# Patient Record
Sex: FEMALE | Race: WHITE | NOT HISPANIC OR LATINO | Employment: FULL TIME | URBAN - METROPOLITAN AREA
[De-identification: names, ages, dates, MRNs, and addresses within clinical notes are randomized per-mention and may not be internally consistent; named-entity substitution may affect disease eponyms.]

---

## 2017-02-28 ENCOUNTER — CONSULT (OUTPATIENT)
Dept: OBSTETRICS AND GYNECOLOGY | Age: 37
End: 2017-02-28

## 2017-02-28 VITALS — SYSTOLIC BLOOD PRESSURE: 102 MMHG | HEIGHT: 64 IN | DIASTOLIC BLOOD PRESSURE: 58 MMHG

## 2017-02-28 DIAGNOSIS — Z30.09 EVALUATION REGARDING CONTRACEPTION OPTIONS: Primary | ICD-10-CM

## 2017-02-28 PROCEDURE — 99213 OFFICE O/P EST LOW 20 MIN: CPT | Performed by: OBSTETRICS & GYNECOLOGY

## 2017-02-28 NOTE — PROGRESS NOTES
Subjective   Hailey Mason is a 36 y.o. female is being seen today for Consult about paragard  Chief Complaint   Patient presents with   • Consult     contraception   .    History of Present Illness  Patient is here to discuss contraceptive options.  She has been on condoms for quite a while and that is okay and she is using pill in the distant past.  She grabbed not use any hormonal method so we talked about all the different types of birth control today and specifically, straight on the ParaGard IUD.  I told her about how it has been approved and half hours placed I told her the risks including perforation abnormal placement infection and body rejection.  I gave her the brochure and she will want us to check insurance for this.  The following portions of the patient's history were reviewed and updated as appropriate: allergies, current medications, past family history, past medical history, past social history, past surgical history and problem list.    PAST MEDICAL HISTORY  History reviewed. No pertinent past medical history.  OB History     No data available        History reviewed. No pertinent past surgical history.  History reviewed. No pertinent family history.  History   Smoking Status   • Never Smoker   Smokeless Tobacco   • Not on file     No current outpatient prescriptions on file.    There is no immunization history on file for this patient.    Review of Systems  Discussed  Objective   Physical Exam    Well-developed well-nourished white female no acute distress  Assessment/Plan   Hailey was seen today for consult.    Diagnoses and all orders for this visit:    Evaluation regarding contraception options      We'll check insurance for IUD she will call back on it.  If she indeed wants to have it placed

## 2017-06-07 ENCOUNTER — OFFICE VISIT (OUTPATIENT)
Dept: OBSTETRICS AND GYNECOLOGY | Age: 37
End: 2017-06-07

## 2017-06-07 VITALS
DIASTOLIC BLOOD PRESSURE: 64 MMHG | HEIGHT: 64 IN | SYSTOLIC BLOOD PRESSURE: 122 MMHG | WEIGHT: 130 LBS | BODY MASS INDEX: 22.2 KG/M2

## 2017-06-07 DIAGNOSIS — Z30.09 CONTRACEPTIVE EDUCATION: Primary | ICD-10-CM

## 2017-06-07 LAB
B-HCG UR QL: NEGATIVE
INTERNAL NEGATIVE CONTROL: NEGATIVE
INTERNAL POSITIVE CONTROL: POSITIVE
Lab: NORMAL

## 2017-06-07 PROCEDURE — 81025 URINE PREGNANCY TEST: CPT | Performed by: PHYSICIAN ASSISTANT

## 2017-06-07 PROCEDURE — 99212 OFFICE O/P EST SF 10 MIN: CPT | Performed by: PHYSICIAN ASSISTANT

## 2017-06-07 NOTE — PROGRESS NOTES
"Subjective     Chief Complaint   Patient presents with   • Procedure     paragard insert       Hailey Mason is a 36 y.o. No obstetric history on file. whose LMP is Patient's last menstrual period was 06/02/2017 (exact date). presents for her IUD insertion.  However she is getting ready to go on a trip to [facility] and leaves almost exactly at the same time as her period start at the same time.  We discussed r/b/a, but after much discussion we decided that since she knows how her period is without the IUD, that it would be best to wait until she was in country after the IUD placement just in case of adverse SE.  She was on the fence anyway and I think feels more comfortable waiting to insert so as not to ruin her vacation      No Additional Complaints Reported    The following portions of the patient's history were reviewed and updated as appropriate:vital signs, allergies, current medications, past family history, past medical history, past social history, past surgical history and problem list      Review of Systems   Pertinent items are noted in HPI.     Objective      /64  Ht 64\" (162.6 cm)  Wt 130 lb (59 kg)  LMP 06/02/2017 (Exact Date)  Breastfeeding? No  BMI 22.31 kg/m2    Physical Exam    General:   alert, comfortable and no distress   Heart: Not performed today   Lungs: Not performed today.   Breast: Not performed today   Neck: na   Abdomen: {Not performed today   CVA: Not performed today   Pelvis: Not performed today   Extremities: Not performed today   Neurologic: Not performed today   Psychiatric: Normal affect, judgement, and mood       Lab Review   Labs: Urine pregnancy test     Imaging   No data reviewed    Assessment/Plan     ASSESSMENT  1. Contraceptive education        PLAN  1.   Orders Placed This Encounter   Procedures   • POC Pregnancy, Urine       2. After discussion with pt, we opted to hold off on the insertion since we were unsure how her period would be.  She will c/w " condoms until she has her cycle and call for placement          Follow up: 8 week(s)approx for IUD insert    ALIVIA Campbell  6/7/2017

## 2017-10-03 ENCOUNTER — TELEPHONE (OUTPATIENT)
Dept: OBSTETRICS AND GYNECOLOGY | Age: 37
End: 2017-10-03

## 2018-12-24 ENCOUNTER — OFFICE VISIT (OUTPATIENT)
Dept: OBSTETRICS AND GYNECOLOGY | Age: 38
End: 2018-12-24

## 2018-12-24 VITALS
SYSTOLIC BLOOD PRESSURE: 102 MMHG | HEIGHT: 62 IN | BODY MASS INDEX: 24.84 KG/M2 | WEIGHT: 135 LBS | DIASTOLIC BLOOD PRESSURE: 62 MMHG

## 2018-12-24 DIAGNOSIS — Z01.419 ENCOUNTER FOR WELL WOMAN EXAM: Primary | ICD-10-CM

## 2018-12-24 DIAGNOSIS — Z00.00 ANNUAL PHYSICAL EXAM: ICD-10-CM

## 2018-12-24 DIAGNOSIS — K59.01 SLOW TRANSIT CONSTIPATION: ICD-10-CM

## 2018-12-24 PROCEDURE — 99395 PREV VISIT EST AGE 18-39: CPT | Performed by: OBSTETRICS & GYNECOLOGY

## 2018-12-24 NOTE — PROGRESS NOTES
Subjective   Hailey Mason is a 38 y.o. female is being seen today for   Chief Complaint   Patient presents with   • Gynecologic Exam     annual exam last pap 06/26/2015 neg   .    History of Present Illness  Patient is here for an annual check.  Couple years since she is doing well with no particular changes or problems last couple of years.  Still has little bit of constipation takes a couple prune seems to work well.  Her mother came down with ulcerative colitis this year but she is now under control.  Her father has been disabled for many many many years but he is still doing okay.  He is 70 she is 73.  Otherwise her bowels bladder work well nothing else in the family.  Brothers 37 lives in [place] has a long-term relationship in [place].  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  There is no interest in pregnancy at this time but did not 1 anything permanently done.  The following portions of the patient's history were reviewed and updated as appropriate: allergies, current medications, past family history, past medical history, past social history, past surgical history and problem list.    Vitals:    12/24/18 1048   BP: 102/62       PAST MEDICAL HISTORY  History reviewed. No pertinent past medical history.  OB History     No data available        History reviewed. No pertinent surgical history.  History reviewed. No pertinent family history.  Social History     Tobacco Use   Smoking Status Never Smoker     No current outpatient medications on file.    There is no immunization history on file for this patient.    Review of Systems   Constitutional: Negative for chills, fatigue, fever and unexpected weight change.   Respiratory: Negative for shortness of breath and wheezing.    Cardiovascular: Negative for chest pain.   Gastrointestinal: Negative for abdominal distention, abdominal pain, blood in stool, constipation, diarrhea and nausea.   Genitourinary: Negative  for difficulty urinating, dyspareunia, dysuria, frequency, hematuria, menstrual problem, pelvic pain, urgency and vaginal discharge.   Skin: Negative for rash.       Objective   Physical Exam   Constitutional: She is oriented to person, place, and time. Vital signs are normal. She appears well-developed and well-nourished.   Neck: No thyromegaly present.   Cardiovascular: Normal rate, regular rhythm and normal heart sounds.   Pulmonary/Chest: Effort normal. Right breast exhibits no inverted nipple, no mass, no nipple discharge, no skin change and no tenderness. Left breast exhibits no inverted nipple, no mass, no nipple discharge, no skin change and no tenderness. Breasts are symmetrical. There is no breast swelling.   Abdominal: Soft.   Genitourinary: Vagina normal and uterus normal. No breast tenderness, discharge or bleeding. Pelvic exam was performed with patient supine. No labial fusion. There is no rash, tenderness, lesion or injury on the right labia. There is no rash, tenderness, lesion or injury on the left labia. Cervix exhibits no motion tenderness, no discharge and no friability. Right adnexum displays no mass, no tenderness and no fullness. Left adnexum displays no mass, no tenderness and no fullness.   Neurological: She is alert and oriented to person, place, and time.   Psychiatric: She has a normal mood and affect.   Vitals reviewed.        Assessment/Plan   Hailey was seen today for gynecologic exam.    Diagnoses and all orders for this visit:    Encounter for well woman exam  -     IGP, Apt HPV,rfx 16 / 18,45; Future  -     IGP, Apt HPV,rfx 16 / 18,45    Annual physical exam    Slow transit constipation      Overall normal exam today.  Pap smear done today.  Mammogram at 39.  Talked about exercise she is good with that back in year.  Her cycles are regular no problems with that and she is using condoms and again no problems with that

## 2018-12-27 LAB
CYTOLOGIST CVX/VAG CYTO: NORMAL
CYTOLOGY CVX/VAG DOC THIN PREP: NORMAL
DX ICD CODE: NORMAL
HIV 1 & 2 AB SER-IMP: NORMAL
HPV I/H RISK 4 DNA CVX QL PROBE+SIG AMP: NEGATIVE
OTHER STN SPEC: NORMAL
PATH REPORT.FINAL DX SPEC: NORMAL
STAT OF ADQ CVX/VAG CYTO-IMP: NORMAL

## 2020-01-02 ENCOUNTER — OFFICE VISIT (OUTPATIENT)
Dept: OBSTETRICS AND GYNECOLOGY | Age: 40
End: 2020-01-02

## 2020-01-02 VITALS
HEIGHT: 62 IN | SYSTOLIC BLOOD PRESSURE: 102 MMHG | BODY MASS INDEX: 25.95 KG/M2 | WEIGHT: 141 LBS | DIASTOLIC BLOOD PRESSURE: 60 MMHG

## 2020-01-02 DIAGNOSIS — Z00.00 ANNUAL PHYSICAL EXAM: Primary | ICD-10-CM

## 2020-01-02 PROCEDURE — 99396 PREV VISIT EST AGE 40-64: CPT | Performed by: OBSTETRICS & GYNECOLOGY

## 2020-01-02 RX ORDER — FLUCONAZOLE 150 MG/1
150 TABLET ORAL ONCE
Qty: 2 TABLET | Refills: 2 | Status: SHIPPED | OUTPATIENT
Start: 2020-01-02 | End: 2020-01-02

## 2020-01-02 NOTE — PROGRESS NOTES
Subjective   Hailey Mason is a 39 y.o. female is being seen today for   Chief Complaint   Patient presents with   • Gynecologic Exam     Pap 2018   .    History of Present Illness  Patient is here for an annual exam.  She is doing very well overall medically she is concerned about by gaining a few pounds.  She exercises well and eats pretty well so she is just try to stay on top of that.  She is changed jobs she is little more sedentary than she was before.  Still using condoms with her  doing well without no interest in pregnancy.  Nothing new in the family father had his knee replaced but that seems okay.  Bowels are the same bladder is fine.  She is also done for minor traveling this past year  The following portions of the patient's history were reviewed and updated as appropriate: allergies, current medications, past family history, past medical history, past social history, past surgical history and problem list.    Vitals:    01/02/20 1535   BP: 102/60       PAST MEDICAL HISTORY  History reviewed. No pertinent past medical history.  OB History    None       History reviewed. No pertinent surgical history.  History reviewed. No pertinent family history.  Social History     Tobacco Use   Smoking Status Never Smoker     No current outpatient medications on file.    There is no immunization history on file for this patient.    Review of Systems   Constitutional: Negative for chills, fatigue, fever and unexpected weight change.   Respiratory: Negative for shortness of breath and wheezing.    Cardiovascular: Negative for chest pain.   Gastrointestinal: Negative for abdominal distention, abdominal pain, blood in stool, constipation, diarrhea and nausea.   Genitourinary: Negative for difficulty urinating, dyspareunia, dysuria, frequency, hematuria, menstrual problem, pelvic pain, urgency and vaginal discharge.   Skin: Negative for rash.       Objective   Physical Exam   Constitutional: She is oriented  to person, place, and time. Vital signs are normal. She appears well-developed and well-nourished.   Neck: No thyromegaly present.   Cardiovascular: Normal rate, regular rhythm and normal heart sounds.   Pulmonary/Chest: Effort normal. Right breast exhibits no inverted nipple, no mass, no nipple discharge, no skin change and no tenderness. Left breast exhibits no inverted nipple, no mass, no nipple discharge, no skin change and no tenderness. No breast swelling, tenderness, discharge or bleeding. Breasts are symmetrical.   Abdominal: Soft.   Genitourinary: Vagina normal and uterus normal. No breast swelling, tenderness, discharge or bleeding. Pelvic exam was performed with patient supine. No labial fusion. There is no rash, tenderness, lesion or injury on the right labia. There is no rash, tenderness, lesion or injury on the left labia. Cervix exhibits no motion tenderness, no discharge and no friability. Right adnexum displays no mass, no tenderness and no fullness. Left adnexum displays no mass, no tenderness and no fullness.   Neurological: She is alert and oriented to person, place, and time.   Psychiatric: She has a normal mood and affect.   Vitals reviewed.        Assessment/Plan   Hailey was seen today for gynecologic exam.    Diagnoses and all orders for this visit:    Annual physical exam        Normal exam today.  Pap smear done last year so not done today.  Mammogram scheduled for couple of weeks from now.  No need for any other testing.  Again she exercises very well back in a year

## 2020-03-10 ENCOUNTER — PROCEDURE VISIT (OUTPATIENT)
Dept: OBSTETRICS AND GYNECOLOGY | Age: 40
End: 2020-03-10

## 2020-03-10 ENCOUNTER — APPOINTMENT (OUTPATIENT)
Dept: WOMENS IMAGING | Facility: HOSPITAL | Age: 40
End: 2020-03-10

## 2020-03-10 DIAGNOSIS — Z12.31 VISIT FOR SCREENING MAMMOGRAM: Primary | ICD-10-CM

## 2020-03-10 PROCEDURE — 77067 SCR MAMMO BI INCL CAD: CPT | Performed by: RADIOLOGY

## 2020-03-10 PROCEDURE — 77067 SCR MAMMO BI INCL CAD: CPT | Performed by: OBSTETRICS & GYNECOLOGY

## 2020-03-18 ENCOUNTER — APPOINTMENT (OUTPATIENT)
Dept: WOMENS IMAGING | Facility: HOSPITAL | Age: 40
End: 2020-03-18

## 2020-03-18 PROCEDURE — 77065 DX MAMMO INCL CAD UNI: CPT | Performed by: RADIOLOGY

## 2020-03-19 ENCOUNTER — TELEPHONE (OUTPATIENT)
Dept: OBSTETRICS AND GYNECOLOGY | Age: 40
End: 2020-03-19

## 2020-03-19 DIAGNOSIS — R92.8 ABNORMAL MAMMOGRAM: Primary | ICD-10-CM

## 2020-03-19 NOTE — TELEPHONE ENCOUNTER
(Maggie pt) Janene from [facility] is calling because they are recommending a  Stereotactic biopsy for left breast calcifications. Pt is unaware and has not scheduled. Janene will fax the reports now.

## 2020-04-06 ENCOUNTER — APPOINTMENT (OUTPATIENT)
Dept: WOMENS IMAGING | Facility: HOSPITAL | Age: 40
End: 2020-04-06

## 2020-04-06 PROCEDURE — 19081 BX BREAST 1ST LESION STRTCTC: CPT | Performed by: RADIOLOGY

## 2020-10-07 ENCOUNTER — TELEPHONE (OUTPATIENT)
Dept: OBSTETRICS AND GYNECOLOGY | Age: 40
End: 2020-10-07

## 2020-10-07 DIAGNOSIS — R92.8 ABNORMALITY OF LEFT BREAST ON SCREENING MAMMOGRAM: Primary | ICD-10-CM

## 2020-10-07 NOTE — TELEPHONE ENCOUNTER
PT SCHEDULED AT [facility] 11/10/20 7:30. CALLED PT WITH INFO. THEY HAD NO APPOINTMENTS IN OCT[place]im [place]

## 2020-11-10 ENCOUNTER — APPOINTMENT (OUTPATIENT)
Dept: WOMENS IMAGING | Facility: HOSPITAL | Age: 40
End: 2020-11-10

## 2020-11-10 PROCEDURE — 77061 BREAST TOMOSYNTHESIS UNI: CPT | Performed by: RADIOLOGY

## 2020-11-10 PROCEDURE — 77065 DX MAMMO INCL CAD UNI: CPT | Performed by: RADIOLOGY

## 2020-11-10 PROCEDURE — G0279 TOMOSYNTHESIS, MAMMO: HCPCS | Performed by: RADIOLOGY

## 2021-04-02 ENCOUNTER — BULK ORDERING (OUTPATIENT)
Dept: CASE MANAGEMENT | Facility: OTHER | Age: 41
End: 2021-04-02

## 2021-04-02 DIAGNOSIS — Z23 IMMUNIZATION DUE: ICD-10-CM

## 2021-05-20 ENCOUNTER — APPOINTMENT (OUTPATIENT)
Dept: WOMENS IMAGING | Facility: HOSPITAL | Age: 41
End: 2021-05-20

## 2021-05-20 ENCOUNTER — PROCEDURE VISIT (OUTPATIENT)
Dept: OBSTETRICS AND GYNECOLOGY | Age: 41
End: 2021-05-20

## 2021-05-20 ENCOUNTER — OFFICE VISIT (OUTPATIENT)
Dept: OBSTETRICS AND GYNECOLOGY | Age: 41
End: 2021-05-20

## 2021-05-20 VITALS
WEIGHT: 135 LBS | SYSTOLIC BLOOD PRESSURE: 110 MMHG | BODY MASS INDEX: 24.84 KG/M2 | HEIGHT: 62 IN | DIASTOLIC BLOOD PRESSURE: 56 MMHG

## 2021-05-20 DIAGNOSIS — N64.4 BREAST PAIN, LEFT: ICD-10-CM

## 2021-05-20 DIAGNOSIS — Z01.419 WELL WOMAN EXAM WITH ROUTINE GYNECOLOGICAL EXAM: Primary | ICD-10-CM

## 2021-05-20 DIAGNOSIS — Z12.31 VISIT FOR SCREENING MAMMOGRAM: Primary | ICD-10-CM

## 2021-05-20 DIAGNOSIS — Z11.51 ENCOUNTER FOR SCREENING FOR HUMAN PAPILLOMAVIRUS (HPV): ICD-10-CM

## 2021-05-20 PROCEDURE — 99213 OFFICE O/P EST LOW 20 MIN: CPT | Performed by: OBSTETRICS & GYNECOLOGY

## 2021-05-20 PROCEDURE — 99396 PREV VISIT EST AGE 40-64: CPT | Performed by: OBSTETRICS & GYNECOLOGY

## 2021-05-20 PROCEDURE — 77067 SCR MAMMO BI INCL CAD: CPT | Performed by: OBSTETRICS & GYNECOLOGY

## 2021-05-20 PROCEDURE — 77067 SCR MAMMO BI INCL CAD: CPT | Performed by: RADIOLOGY

## 2021-05-20 NOTE — PROGRESS NOTES
"Chief complaint: annual    Subjective   History of Present Illness    Hailey Mason is a 40 y.o.  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         Current contraception: condoms  History of abnormal Pap smear: no  Received Gardasil immunization: no  Perform regular self breast exam: yes -    Family history of uterine or ovarian cancer: no  Family History of colon cancer: no  Family history of breast cancer: yes - MGM dx in her late 90s    Mammogram: done today.  Colonoscopy: not indicated.  DEXA: not indicated.    Exercise: moderately active  Calcium/Vitamin D: adequate intake    The following portions of the patient's history were reviewed and updated as appropriate: allergies, current medications, past family history, past medical history, past social history, past surgical history and problem list.    Review of Systems   Constitutional: Negative.    Endocrine:        Left breast pain   Genitourinary: Negative.        Pertinent items are noted in HPI.     Objective   Physical Exam    /56   Ht 157.5 cm (62\")   Wt 61.2 kg (135 lb)   LMP 2021 (Approximate)   Breastfeeding No   BMI 24.69 kg/m²     General:   alert, appears stated age and cooperative   Neck: no asymmetry, masses, or scars   Heart: regular rate and rhythm, S1, S2 normal, no murmur, click, rub or gallop   Lungs: clear to " auscultation bilaterally   Abdomen: soft, non-tender, without masses or organomegaly   Breast: inspection negative, no nipple discharge or bleeding, no masses or nodularity palpable   Vulva: normal, Bartholin's, Urethra, [place]'s normal   Vagina: normal mucosa   Cervix: no bleeding following Pap, no cervical motion tenderness, no lesions and nulliparous appearance   Uterus: normal size, anteverted, mobile, non-tender, normal shape and consistency   Adnexa: normal adnexa and no mass, fullness, tenderness   Rectal: not indicated     Assessment/Plan   Diagnoses and all orders for this visit:    1. Well woman exam with routine gynecological exam (Primary)  -     IGP, Aptima HPV, Rfx 16 / 18,45    2. Encounter for screening for human papillomavirus (HPV)  -     IGP, Aptima HPV, Rfx 16 / 18,45    3. Breast pain, left  -     Ambulatory Referral to Breast Surgery    breast exam was benign but I would recommend evaluation with breast surgeon for above c/o    Breast self exam technique reviewed and patient encouraged to perform self-exam monthly.  Discussed healthy lifestyle modifications.  Pap smear sent    She will see Dr Bro next year for annual gyn visit

## 2021-05-24 LAB
CYTOLOGIST CVX/VAG CYTO: NORMAL
CYTOLOGY CVX/VAG DOC CYTO: NORMAL
CYTOLOGY CVX/VAG DOC THIN PREP: NORMAL
DX ICD CODE: NORMAL
HIV 1 & 2 AB SER-IMP: NORMAL
HPV I/H RISK 4 DNA CVX QL PROBE+SIG AMP: NEGATIVE
OTHER STN SPEC: NORMAL
STAT OF ADQ CVX/VAG CYTO-IMP: NORMAL

## 2021-05-25 ENCOUNTER — TELEPHONE (OUTPATIENT)
Dept: SURGERY | Facility: CLINIC | Age: 41
End: 2021-05-25

## 2021-05-25 NOTE — TELEPHONE ENCOUNTER
New patient appointment with Mindy Yo NP is scheduled on 7/26/2021 @ 8:30am.    Called and left message with patient about appointment time, patient to call back and confirm.    Sent patient a reminder letter in the mail.

## 2021-06-30 ENCOUNTER — TELEPHONE (OUTPATIENT)
Dept: SURGERY | Facility: CLINIC | Age: 41
End: 2021-06-30

## 2021-07-02 ENCOUNTER — TELEPHONE (OUTPATIENT)
Dept: SURGERY | Facility: CLINIC | Age: 41
End: 2021-07-02

## 2021-07-12 ENCOUNTER — DOCUMENTATION (OUTPATIENT)
Dept: SURGERY | Facility: CLINIC | Age: 41
End: 2021-07-12

## 2022-05-26 ENCOUNTER — OFFICE VISIT (OUTPATIENT)
Dept: OBSTETRICS AND GYNECOLOGY | Age: 42
End: 2022-05-26

## 2022-05-26 ENCOUNTER — PROCEDURE VISIT (OUTPATIENT)
Dept: OBSTETRICS AND GYNECOLOGY | Age: 42
End: 2022-05-26

## 2022-05-26 ENCOUNTER — APPOINTMENT (OUTPATIENT)
Dept: WOMENS IMAGING | Facility: HOSPITAL | Age: 42
End: 2022-05-26

## 2022-05-26 VITALS
BODY MASS INDEX: 25.91 KG/M2 | DIASTOLIC BLOOD PRESSURE: 60 MMHG | HEIGHT: 62 IN | SYSTOLIC BLOOD PRESSURE: 108 MMHG | WEIGHT: 140.8 LBS

## 2022-05-26 DIAGNOSIS — Z11.3 SCREEN FOR STD (SEXUALLY TRANSMITTED DISEASE): ICD-10-CM

## 2022-05-26 DIAGNOSIS — Z12.31 VISIT FOR SCREENING MAMMOGRAM: Primary | ICD-10-CM

## 2022-05-26 DIAGNOSIS — Z01.419 WELL WOMAN EXAM WITH ROUTINE GYNECOLOGICAL EXAM: Primary | ICD-10-CM

## 2022-05-26 PROCEDURE — 77067 SCR MAMMO BI INCL CAD: CPT | Performed by: STUDENT IN AN ORGANIZED HEALTH CARE EDUCATION/TRAINING PROGRAM

## 2022-05-26 PROCEDURE — 77063 BREAST TOMOSYNTHESIS BI: CPT | Performed by: STUDENT IN AN ORGANIZED HEALTH CARE EDUCATION/TRAINING PROGRAM

## 2022-05-26 PROCEDURE — 77063 BREAST TOMOSYNTHESIS BI: CPT | Performed by: RADIOLOGY

## 2022-05-26 PROCEDURE — 99396 PREV VISIT EST AGE 40-64: CPT | Performed by: STUDENT IN AN ORGANIZED HEALTH CARE EDUCATION/TRAINING PROGRAM

## 2022-05-26 PROCEDURE — 77067 SCR MAMMO BI INCL CAD: CPT | Performed by: RADIOLOGY

## 2022-05-26 NOTE — PROGRESS NOTES
[facility]   Obstetrics and Gynecology   Routine Annual Visit    2022    Patient: Hailey Mason          MR#:[id number]    History of Present Illness    Chief Complaint   Patient presents with   • Annual Exam     AE and MG today, Last AE 2021 w/pap nml and HPV neg, c/o left breast aching since biopsy in        41 y.o. female  who presents for annual exam.  Notes minor aching in left breast since bx in .  Not painful, just aware of it.      Reports regular monthly menses lasting 5 days.  No issues.      MGM with breast cancer in late 80s    Studies reviewed:  IGP, Aptima HPV, Rfx 16 / 18,45 (2021 13:39) - NIL, HPV neg  Mammo Screening Bilateral With CAD (2021) - normal, repeat today, h/o benign bx     Obstetric History:  OB History        0    Para   0    Term   0       0    AB   0    Living   0       SAB   0    IAB   0    Ectopic   0    Molar   0    Multiple   0    Live Births   0               Menstrual History:     Patient's last menstrual period was 2022 (approximate).       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    ________________________________________  Patient Active Problem List   Diagnosis   • Slow transit constipation   • Abnormality of left breast on screening mammogram     Past Medical History:   Diagnosis Date   • Migraine      Past Surgical History:   Procedure Laterality Date   • BREAST BIOPSY Left    • LASIK Bilateral    • WISDOM TOOTH EXTRACTION       Social History     Tobacco Use   Smoking Status Never Smoker   Smokeless Tobacco Never Used     Family History   Problem Relation Age of Onset   • Hypertension Father    • Crohn's disease Mother    • Breast cancer Maternal Grandmother    • Ovarian cancer Neg Hx    • Endometrial cancer Neg Hx    • Colon cancer Neg Hx    • Uterine cancer Neg Hx      Prior to Admission  "medications    Medication Sig Start Date End Date Taking? Authorizing Provider   DOXYCYCLINE PO Take  by mouth Daily As Needed.  5/26/22  ProviderHilaria MD     ________________________________________    Current contraception: condoms  History of abnormal Pap smear: no  Family history of uterine or ovarian cancer: no  Family History of colon cancer/colon polyps: no  History of abnormal mammogram: yes - benign bx    The following portions of the patient's history were reviewed and updated as appropriate: allergies, current medications, past family history, past medical history, past social history, past surgical history and problem list.    Review of Systems   All other systems reviewed and are negative.           Objective     /60   Ht 157.5 cm (62\")   Wt 63.9 kg (140 lb 12.8 oz)   LMP 05/02/2022 (Approximate)   Breastfeeding No   BMI 25.75 kg/m²    BP Readings from Last 3 Encounters:   05/26/22 108/60   05/20/21 110/56   01/02/20 102/60      Wt Readings from Last 3 Encounters:   05/26/22 63.9 kg (140 lb 12.8 oz)   05/20/21 61.2 kg (135 lb)   01/02/20 64 kg (141 lb)        BMI: Estimated body mass index is 25.75 kg/m² as calculated from the following:    Height as of this encounter: 157.5 cm (62\").    Weight as of this encounter: 63.9 kg (140 lb 12.8 oz).    Physical Exam  Vitals and nursing note reviewed.   Constitutional:       General: She is not in acute distress.     Appearance: Normal appearance.   HENT:      Head: Normocephalic and atraumatic.   Eyes:      Extraocular Movements: Extraocular movements intact.   Cardiovascular:      Rate and Rhythm: Normal rate and regular rhythm.      Pulses: Normal pulses.      Heart sounds: No murmur heard.  Pulmonary:      Effort: Pulmonary effort is normal. No respiratory distress.      Breath sounds: Normal breath sounds.   Chest:   Breasts:      Right: Normal. No mass, nipple discharge, skin change, tenderness or axillary adenopathy.      Left: Normal. " No mass, nipple discharge, skin change, tenderness or axillary adenopathy.       Abdominal:      General: There is no distension.      Palpations: Abdomen is soft. There is no mass.      Tenderness: There is no abdominal tenderness.   Genitourinary:     General: Normal vulva.      Labia:         Right: No rash or lesion.         Left: No rash or lesion.       Urethra: No prolapse, urethral swelling or urethral lesion.      Vagina: Normal.      Cervix: Normal.      Uterus: Normal.       Adnexa: Right adnexa normal and left adnexa normal.      Comments: Bladder: no masses or tenderness  Perineum/Anus: no masses, lesions, or skin changes  Musculoskeletal:         General: No swelling. Normal range of motion.      Cervical back: Normal range of motion.   Lymphadenopathy:      Upper Body:      Right upper body: No axillary adenopathy.      Left upper body: No axillary adenopathy.   Skin:     General: Skin is warm and dry.   Neurological:      General: No focal deficit present.      Mental Status: She is alert and oriented to person, place, and time.   Psychiatric:         Mood and Affect: Mood normal.         Behavior: Behavior normal.         As part of wellness and prevention, the following topics were discussed with the patient:  Encouraged self breast exam  Physical activity and regular exercised encouraged.   Injury prevention discussed.  Healthy weight discussed.  Nutrition discussed.  Substance abuse/misuse discussed.  Sexual behavior/safe practices discussed.   Sexual transmitted disease prevention   Contraception discussed.   Mental health discussed.           Assessment:  Diagnoses and all orders for this visit:    1. Well woman exam with routine gynecological exam (Primary)  -     Chlamydia trachomatis, Neisseria gonorrhoeae, Trichomonas vaginalis, PCR - Swab, Vagina    2. Screen for STD (sexually transmitted disease)  -     Chlamydia trachomatis, Neisseria gonorrhoeae, Trichomonas vaginalis, PCR - Swab,  Vagina      -Breast and pelvic exam normal  - STD screen today  - Pap up-to-date  - Mammogram today, will call patient with results    Plan:  Return in about 1 year (around 5/26/2023) for Annual.      Sharron Bro MD  5/26/2022 15:41 EDT

## 2022-05-28 LAB
C TRACH RRNA SPEC QL NAA+PROBE: NEGATIVE
N GONORRHOEA RRNA SPEC QL NAA+PROBE: NEGATIVE
T VAGINALIS RRNA SPEC QL NAA+PROBE: NEGATIVE

## 2022-05-31 NOTE — PROGRESS NOTES
Please call patient to let her know her STD panel was negative. Thank you!    Sharron Bro MD  5/31/2022  10:52 EDT

## 2022-06-01 ENCOUNTER — TELEPHONE (OUTPATIENT)
Dept: OBSTETRICS AND GYNECOLOGY | Age: 42
End: 2022-06-01

## 2022-06-01 NOTE — TELEPHONE ENCOUNTER
----- Message from Sharron Bro MD sent at 6/1/2022  8:17 AM EDT -----  Please call patient to let her know her mammogram is normal and should be repeated in 1 year.    Thank you,  Sharron Bro MD  06/01/22 08:17 EDT

## 2023-08-12 ENCOUNTER — HOSPITAL ENCOUNTER (OUTPATIENT)
Facility: HOSPITAL | Age: 43
Discharge: HOME | End: 2023-08-12
Admitting: STUDENT IN AN ORGANIZED HEALTH CARE EDUCATION/TRAINING PROGRAM
Payer: COMMERCIAL

## 2023-08-12 PROCEDURE — 77067 SCR MAMMO BI INCL CAD: CPT

## 2023-08-12 PROCEDURE — 77063 BREAST TOMOSYNTHESIS BI: CPT

## 2023-08-17 DIAGNOSIS — R92.8 ABNORMAL MAMMOGRAM: Primary | ICD-10-CM

## 2023-08-22 ENCOUNTER — TELEPHONE (OUTPATIENT)
Dept: OBSTETRICS AND GYNECOLOGY | Age: 43
End: 2023-08-22
Payer: COMMERCIAL

## 2023-08-22 NOTE — TELEPHONE ENCOUNTER
"    Caller: Hailey Mason \"LB\"    Relationship: Self    Best call back number: [phone]    What orders are you requesting (i.e. lab or imaging): BREAST U/S/MAMMOGRAM    In what timeframe would the patient need to come in: ASAP    Where will you receive your lab/imaging services: [facility]  FAX# [phone]    Additional notes: PT WOULD LIKE TO HAVE IMAGING DONE AT ANOTHER FACILITY DUE TO INSURANCE REASONS PLEASE FAX ORDERS TO THE ABOVE INFO         "

## 2023-09-07 ENCOUNTER — TELEPHONE (OUTPATIENT)
Dept: OBSTETRICS AND GYNECOLOGY | Age: 43
End: 2023-09-07
Payer: COMMERCIAL

## 2023-09-07 DIAGNOSIS — R92.8 ABNORMAL MAMMOGRAM: Primary | ICD-10-CM

## 2023-09-07 NOTE — TELEPHONE ENCOUNTER
This is the second request of order being faxed. First time was to a different number.    Order faxed to [place] with [facility].

## 2023-09-07 NOTE — TELEPHONE ENCOUNTER
[facility] is needing a [facility] order for pt.It should be for a Bilateral breast ultrasound.Needs it faxed to [phone],Attn: Latisha.She is with [facility] and cannot pull the order over.

## 2023-09-07 NOTE — TELEPHONE ENCOUNTER
LÁZARO FROM [facility] IS REQUESTING TO HAVE PT'S ORDER FOR RIGHT BREAST U/S FAXED [phone].

## 2023-10-04 ENCOUNTER — OFFICE VISIT (OUTPATIENT)
Dept: OBSTETRICS AND GYNECOLOGY | Age: 43
End: 2023-10-04
Payer: COMMERCIAL

## 2023-10-04 VITALS
SYSTOLIC BLOOD PRESSURE: 104 MMHG | BODY MASS INDEX: 26.24 KG/M2 | HEIGHT: 62 IN | WEIGHT: 142.6 LBS | DIASTOLIC BLOOD PRESSURE: 62 MMHG

## 2023-10-04 DIAGNOSIS — Z12.31 SCREENING MAMMOGRAM FOR BREAST CANCER: ICD-10-CM

## 2023-10-04 DIAGNOSIS — Z01.419 WELL WOMAN EXAM WITH ROUTINE GYNECOLOGICAL EXAM: Primary | ICD-10-CM

## 2023-10-04 PROCEDURE — 99396 PREV VISIT EST AGE 40-64: CPT | Performed by: STUDENT IN AN ORGANIZED HEALTH CARE EDUCATION/TRAINING PROGRAM

## 2023-10-04 RX ORDER — ADAPALENE AND BENZOYL PEROXIDE 3; 25 MG/G; MG/G
GEL TOPICAL
COMMUNITY
Start: 2023-08-28

## 2023-10-04 RX ORDER — DOXYCYCLINE HYCLATE 100 MG
TABLET ORAL
COMMUNITY
Start: 2023-08-28

## 2023-10-04 NOTE — PROGRESS NOTES
[facility]   Obstetrics and Gynecology   Routine Annual Visit    10/4/2023    Patient: Hailey Mason          MR#:[id number]    History of Present Illness    Chief Complaint   Patient presents with    Annual Exam     AE today, Last AE 2022, Last pap 2021 nml and HPV neg, MG 2023 w/diagnostic follow up nml, No problems today       42 y.o. female  who presents for annual exam.  Regular monthly menses.  No issues.  Uses condoms for contraception.  Declines other options.    Studies reviewed:  IGP, Aptima HPV, Rfx 16 / 18,45 (2021 13:39) - NIL, HPV neg  Mammogram diagnostic right (2023 10:23) abnormal screening mammo with normal diagnostic, h/o benign bx, repeat 1 yr    Obstetric History:  OB History          0    Para   0    Term   0       0    AB   0    Living   0         SAB   0    IAB   0    Ectopic   0    Molar   0    Multiple   0    Live Births   0               Menstrual History:     Patient's last menstrual period was 2023 (approximate).       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[employer]    ________________________________________  Patient Active Problem List   Diagnosis    Slow transit constipation    Abnormality of left breast on screening mammogram     Past Medical History:   Diagnosis Date    Migraine      Past Surgical History:   Procedure Laterality Date    BREAST BIOPSY Left     LASIK Bilateral 2012    WISDOM TOOTH EXTRACTION       Social History     Tobacco Use   Smoking Status Never    Passive exposure: Never   Smokeless Tobacco Never     Family History   Problem Relation Age of Onset    Hypertension Father     Crohn's disease Mother     Breast cancer Maternal Grandmother 87        Diagnosed     Ovarian cancer Neg Hx     Endometrial cancer Neg Hx     Colon cancer Neg Hx     Uterine cancer Neg Hx      Prior to Admission medications   "  Medication Sig Start Date End Date Taking? Authorizing Provider   Adapalene-Benzoyl Peroxide 0.3-2.5 % gel  8/28/23  Yes ProviderHilaria MD   doxycycline (VIBRAMYICN) 100 MG tablet  8/28/23  Yes Provider, MD Hilaria     ________________________________________    Current contraception: condoms  History of abnormal Pap smear: no  Family history of uterine or ovarian cancer: no  Family History of colon cancer/colon polyps: no  History of abnormal mammogram: yes - see above    The following portions of the patient's history were reviewed and updated as appropriate: allergies, current medications, past family history, past medical history, past social history, past surgical history, and problem list.    Review of Systems   All other systems reviewed and are negative.         Objective     /62   Ht 157.5 cm (62\")   Wt 64.7 kg (142 lb 9.6 oz)   LMP 09/20/2023 (Approximate)   Breastfeeding No   BMI 26.08 kg/m²    BP Readings from Last 3 Encounters:   10/04/23 104/62   05/26/22 108/60   05/20/21 110/56      Wt Readings from Last 3 Encounters:   10/04/23 64.7 kg (142 lb 9.6 oz)   05/26/22 63.9 kg (140 lb 12.8 oz)   05/20/21 61.2 kg (135 lb)        BMI: Estimated body mass index is 26.08 kg/m² as calculated from the following:    Height as of this encounter: 157.5 cm (62\").    Weight as of this encounter: 64.7 kg (142 lb 9.6 oz).    Physical Exam  Vitals and nursing note reviewed.   Constitutional:       General: She is not in acute distress.     Appearance: Normal appearance.   HENT:      Head: Normocephalic and atraumatic.   Eyes:      Extraocular Movements: Extraocular movements intact.   Cardiovascular:      Rate and Rhythm: Normal rate and regular rhythm.      Pulses: Normal pulses.      Heart sounds: No murmur heard.  Pulmonary:      Effort: Pulmonary effort is normal. No respiratory distress.      Breath sounds: Normal breath sounds.   Chest:   Breasts:     Right: Normal. No mass, nipple " discharge, skin change or tenderness.      Left: Normal. No mass, nipple discharge, skin change or tenderness.   Abdominal:      General: There is no distension.      Palpations: Abdomen is soft. There is no mass.      Tenderness: There is no abdominal tenderness.   Genitourinary:     General: Normal vulva.      Labia:         Right: No rash or lesion.         Left: No rash or lesion.       Urethra: No prolapse, urethral swelling or urethral lesion.      Vagina: Normal.      Cervix: Normal.      Uterus: Normal.       Adnexa: Right adnexa normal and left adnexa normal.      Comments: Bladder: no masses or tenderness  Perineum/Anus: no masses, lesions, or skin changes  Musculoskeletal:         General: No swelling. Normal range of motion.      Cervical back: Normal range of motion.   Lymphadenopathy:      Upper Body:      Right upper body: No axillary adenopathy.      Left upper body: No axillary adenopathy.   Skin:     General: Skin is warm and dry.   Neurological:      General: No focal deficit present.      Mental Status: She is alert and oriented to person, place, and time.   Psychiatric:         Mood and Affect: Mood normal.         Behavior: Behavior normal.       As part of wellness and prevention, the following topics were discussed with the patient:  Encouraged self breast exam  Physical activity and regular exercised encouraged.   Injury prevention discussed.  Healthy weight discussed.  Nutrition discussed.  Substance abuse/misuse discussed.  Sexual behavior/safe practices discussed.   Sexual transmitted disease prevention   Contraception discussed.   Mental health discussed.           Assessment:  Diagnoses and all orders for this visit:    1. Well woman exam with routine gynecological exam (Primary)  -     Mammo Screening Digital Tomosynthesis Bilateral With CAD; Future    2. Screening mammogram for breast cancer  -     Mammo Screening Digital Tomosynthesis Bilateral With CAD; Future      -Breast and  pelvic exam normal  - Pap up-to-date  - Mammogram up-to-date  - Colonoscopy not yet indicated  - Declines STD screening  - Declines contraception    Plan:  Return in about 1 year (around 10/4/2024) for Annual.      Sharron Bro MD  10/4/2023 08:31 EDT

## 2024-01-02 ENCOUNTER — OFFICE VISIT (OUTPATIENT)
Dept: OBSTETRICS AND GYNECOLOGY | Age: 44
End: 2024-01-02
Payer: COMMERCIAL

## 2024-01-02 VITALS
WEIGHT: 146 LBS | HEIGHT: 62 IN | DIASTOLIC BLOOD PRESSURE: 62 MMHG | SYSTOLIC BLOOD PRESSURE: 104 MMHG | BODY MASS INDEX: 26.87 KG/M2

## 2024-01-02 DIAGNOSIS — N64.4 BREAST PAIN, LEFT: Primary | ICD-10-CM

## 2024-01-02 PROCEDURE — 99213 OFFICE O/P EST LOW 20 MIN: CPT | Performed by: PHYSICIAN ASSISTANT

## 2024-01-02 NOTE — PROGRESS NOTES
"Subjective     Chief Complaint   Patient presents with    Breast Problem     C/o left breast lump, first noticed 9 days ago and it is painful.       Hailey Mason is a 43 y.o.  whose LMP is Patient's last menstrual period was 12/15/2023 (approximate). presents with left breast pain    She noted some breast pain 12 days ago  First time she experienced something like that  Denies trauma and no bruising  Could feel something in the area as well-felt like a \"mass\"  Feels better now and mass feels \"normal\"    Does have a h/o breat biopsy in left breast      No Additional Complaints Reported    The following portions of the patient's history were reviewed and updated as appropriate:no additional history reviewed, vital signs, allergies, current medications, past medical history, past social history, past surgical history, and problem list      Review of Systems   Integument/breast: positive for breast lump and breast tenderness     Objective      /62   Ht 157.5 cm (62\")   Wt 66.2 kg (146 lb)   LMP 12/15/2023 (Approximate)   BMI 26.70 kg/m²     Physical Exam    General:   alert, comfortable, and no distress   Heart: Not performed today   Lungs: Not performed today.   Breast: normal appearance, no masses or tenderness, Inspection negative, No nipple retraction or dimpling, No nipple discharge or bleeding, No axillary or supraclavicular adenopathy, Normal to palpation without dominant masses, Taught monthly breast self examination, left breast with no palpable mass -dense tissue noted at axillary region   Neck: Not performed today   Abdomen: Not performed today   CVA: Not performed today   Pelvis: Not performed today   Extremities: Not performed today   Neurologic: negative   Psychiatric: Normal affect, judgement, and mood       Lab Review   Labs: No data reviewed    Imaging   No data reviewed    Assessment & Plan     ASSESSMENT  1. Breast pain, left          PLAN  1.   Orders Placed This Encounter "   Procedures    Mammo Diagnostic Left With CAD    US breast left limited     2. Breast imaging ordered for further reassurance. Sxs have improved so if imaging is negative, will monitor. If c/w breast pain, would recommend f/u with breast specialist.     Follow up: ALIVIA Longoria  1/2/2024

## 2024-10-10 ENCOUNTER — HOSPITAL ENCOUNTER (OUTPATIENT)
Facility: HOSPITAL | Age: 44
Discharge: HOME | End: 2024-10-10
Admitting: STUDENT IN AN ORGANIZED HEALTH CARE EDUCATION/TRAINING PROGRAM
Payer: COMMERCIAL

## 2024-10-10 DIAGNOSIS — Z12.31 SCREENING MAMMOGRAM FOR BREAST CANCER: ICD-10-CM

## 2024-10-10 DIAGNOSIS — Z01.419 WELL WOMAN EXAM WITH ROUTINE GYNECOLOGICAL EXAM: ICD-10-CM

## 2024-10-10 PROCEDURE — 77067 SCR MAMMO BI INCL CAD: CPT

## 2024-10-10 PROCEDURE — 77063 BREAST TOMOSYNTHESIS BI: CPT

## 2024-10-14 ENCOUNTER — TELEPHONE (OUTPATIENT)
Dept: OBSTETRICS AND GYNECOLOGY | Age: 44
End: 2024-10-14
Payer: COMMERCIAL

## 2024-10-14 DIAGNOSIS — R92.8 ABNORMALITY OF LEFT BREAST ON SCREENING MAMMOGRAM: Primary | ICD-10-CM

## 2024-10-14 NOTE — PROGRESS NOTES
Please notify patient of abnormal mammogram and recommendation for additional imaging to better evaluate the abnormality.  These findings will determine need for biopsy.  I have placed necessary orders for imaging.  Thank you!

## 2024-10-14 NOTE — TELEPHONE ENCOUNTER
TC for results: abnormal mammogram and recommendation for additional imaging to better evaluate the abnormality.  These findings will determine need for biopsy.  I have placed necessary orders for imaging.

## 2024-10-16 ENCOUNTER — OFFICE VISIT (OUTPATIENT)
Dept: OBSTETRICS AND GYNECOLOGY | Age: 44
End: 2024-10-16
Payer: COMMERCIAL

## 2024-10-16 VITALS
SYSTOLIC BLOOD PRESSURE: 106 MMHG | DIASTOLIC BLOOD PRESSURE: 60 MMHG | BODY MASS INDEX: 26.35 KG/M2 | WEIGHT: 143.2 LBS | HEIGHT: 62 IN

## 2024-10-16 DIAGNOSIS — Z01.419 WELL WOMAN EXAM WITH ROUTINE GYNECOLOGICAL EXAM: Primary | ICD-10-CM

## 2024-10-16 DIAGNOSIS — Z12.4 SCREENING FOR CERVICAL CANCER: ICD-10-CM

## 2024-10-16 DIAGNOSIS — R92.8 ABNORMAL MAMMOGRAM: ICD-10-CM

## 2024-10-16 NOTE — PROGRESS NOTES
[facility]   Obstetrics and Gynecology   Routine Annual Visit    10/16/2024    Patient: Hailey Mason          MR#:[id number]    History of Present Illness    Chief Complaint   Patient presents with    Annual Exam     AE today, Last AE 10/04/2023, Last pap 2021 nml and HPV neg, MG 10/10/2024, No problems today       43 y.o. female  who presents for annual exam.  Regular monthly menses.  No issues.  Uses condoms for contraception.  Declines other options.     Studies reviewed:  IGP, Aptima HPV, Rfx 16 / 18,45 (2021 13:39) - NIL, HPV neg  Mammo Screening Digital Tomosynthesis Bilateral With CAD (10/10/2024 08:30) abnormal left breast - dx mammo and US being scheduled at [facility], h/o benign bx      Obstetric History:  OB History          0    Para   0    Term   0       0    AB   0    Living   0         SAB   0    IAB   0    Ectopic   0    Molar   0    Multiple   0    Live Births   0               Menstrual History:     Patient's last menstrual period was 10/02/2024 (approximate).       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[employer]    ________________________________________  Patient Active Problem List   Diagnosis    Slow transit constipation    Abnormality of left breast on screening mammogram     Past Medical History:   Diagnosis Date    Migraine      Past Surgical History:   Procedure Laterality Date    BREAST BIOPSY Left     LASIK Bilateral 2012    WISDOM TOOTH EXTRACTION       Social History     Tobacco Use   Smoking Status Never    Passive exposure: Never   Smokeless Tobacco Never     Family History   Problem Relation Age of Onset    Hypertension Father     Crohn's disease Mother     Breast cancer Maternal Grandmother 87        Diagnosed     Ovarian cancer Neg Hx     Endometrial cancer Neg Hx     Colon cancer Neg Hx     Uterine cancer Neg Hx      Prior to Admission  "medications    Medication Sig Start Date End Date Taking? Authorizing Provider   Adapalene-Benzoyl Peroxide 0.3-2.5 % gel  8/28/23   Hilaria Matute MD   doxycycline (VIBRAMYICN) 100 MG tablet  8/28/23   Provider, MD Hilaria     ________________________________________    Current contraception: none  History of abnormal Pap smear: no  Family history of uterine or ovarian cancer: no  Family History of colon cancer/colon polyps: no  History of abnormal mammogram: yes - see above    The following portions of the patient's history were reviewed and updated as appropriate: allergies, current medications, past family history, past medical history, past social history, past surgical history, and problem list.    Review of Systems   All other systems reviewed and are negative.           Objective     /60   Ht 157.5 cm (62\")   Wt 65 kg (143 lb 3.2 oz)   LMP 10/02/2024 (Approximate)   Breastfeeding No   BMI 26.19 kg/m²    BP Readings from Last 3 Encounters:   10/16/24 106/60   01/02/24 104/62   10/04/23 104/62      Wt Readings from Last 3 Encounters:   10/16/24 65 kg (143 lb 3.2 oz)   01/02/24 66.2 kg (146 lb)   10/04/23 64.7 kg (142 lb 9.6 oz)        BMI: Estimated body mass index is 26.19 kg/m² as calculated from the following:    Height as of this encounter: 157.5 cm (62\").    Weight as of this encounter: 65 kg (143 lb 3.2 oz).    Physical Exam  Vitals and nursing note reviewed.   Constitutional:       General: She is not in acute distress.     Appearance: Normal appearance.   HENT:      Head: Normocephalic and atraumatic.   Eyes:      Extraocular Movements: Extraocular movements intact.   Cardiovascular:      Rate and Rhythm: Normal rate and regular rhythm.      Pulses: Normal pulses.      Heart sounds: No murmur heard.  Pulmonary:      Effort: Pulmonary effort is normal. No respiratory distress.      Breath sounds: Normal breath sounds.   Chest:   Breasts:     Right: Normal. No mass, nipple " discharge, skin change or tenderness.      Left: Normal. No mass, nipple discharge, skin change or tenderness.   Abdominal:      General: There is no distension.      Palpations: Abdomen is soft. There is no mass.      Tenderness: There is no abdominal tenderness.   Genitourinary:     General: Normal vulva.      Labia:         Right: No rash or lesion.         Left: No rash or lesion.       Urethra: No prolapse, urethral swelling or urethral lesion.      Vagina: Normal.      Cervix: Normal.      Uterus: Normal.       Adnexa: Right adnexa normal and left adnexa normal.      Comments: Bladder: no masses or tenderness  Perineum/Anus: no masses, lesions, or skin changes  Musculoskeletal:         General: No swelling. Normal range of motion.      Cervical back: Normal range of motion.   Lymphadenopathy:      Upper Body:      Right upper body: No axillary adenopathy.      Left upper body: No axillary adenopathy.   Skin:     General: Skin is warm and dry.   Neurological:      General: No focal deficit present.      Mental Status: She is alert and oriented to person, place, and time.   Psychiatric:         Mood and Affect: Mood normal.         Behavior: Behavior normal.         As part of wellness and prevention, the following topics were discussed with the patient:  Encouraged self breast exam  Physical activity and regular exercised encouraged.   Injury prevention discussed.  Healthy weight discussed.  Nutrition discussed.  Substance abuse/misuse discussed.  Sexual behavior/safe practices discussed.   Sexual transmitted disease prevention   Mental health discussed.           Assessment:  Diagnoses and all orders for this visit:    1. Well woman exam with routine gynecological exam (Primary)  -     IGP, Apt HPV,rfx 16 / 18,45    2. Abnormal mammogram    3. Screening for cervical cancer  -     IGP, Apt HPV,rfx 16 / 18,45    -Breast and pelvic exam normal  - Pap today  - Declined STD screen  - Abnormal left breast mammogram.   She would like follow-up imaging scheduled at [facility].  Schedulers will call her soon to arrange.  - Briefly discussed colon cancer screening starting next year.  Discussed colonoscopy versus Cologuard.  She will consider.      Plan:  Return in about 1 year (around 10/16/2025) for Annual.      Sharron Bro MD  10/16/2024 13:44 EDT

## 2024-10-21 LAB
CYTOLOGIST CVX/VAG CYTO: NORMAL
CYTOLOGY CVX/VAG DOC CYTO: NORMAL
CYTOLOGY CVX/VAG DOC THIN PREP: NORMAL
DX ICD CODE: NORMAL
HPV I/H RISK 4 DNA CVX QL PROBE+SIG AMP: NEGATIVE
Lab: NORMAL
OTHER STN SPEC: NORMAL
STAT OF ADQ CVX/VAG CYTO-IMP: NORMAL

## 2024-12-13 ENCOUNTER — TELEPHONE (OUTPATIENT)
Dept: OBSTETRICS AND GYNECOLOGY | Age: 44
End: 2024-12-13
Payer: COMMERCIAL